# Patient Record
Sex: FEMALE | Race: WHITE | HISPANIC OR LATINO | Employment: UNEMPLOYED | URBAN - METROPOLITAN AREA
[De-identification: names, ages, dates, MRNs, and addresses within clinical notes are randomized per-mention and may not be internally consistent; named-entity substitution may affect disease eponyms.]

---

## 2023-03-10 ENCOUNTER — HOSPITAL ENCOUNTER (OUTPATIENT)
Dept: ULTRASOUND IMAGING | Facility: HOSPITAL | Age: 58
Discharge: HOME | End: 2023-03-10
Attending: INTERNAL MEDICINE

## 2023-03-10 DIAGNOSIS — E04.1 THYROID NODULE: ICD-10-CM

## 2023-08-22 ENCOUNTER — RA CDI HCC (OUTPATIENT)
Dept: OTHER | Facility: HOSPITAL | Age: 58
End: 2023-08-22

## 2023-08-22 NOTE — PROGRESS NOTES
[address] [facility] coding opportunities       Chart reviewed, no opportunity found: CHART REVIEWED, NO OPPORTUNITY FOUND        Patients Insurance     Medicare Insurance: Medicare

## 2023-08-28 ENCOUNTER — OFFICE VISIT (OUTPATIENT)
Dept: INTERNAL MEDICINE CLINIC | Facility: CLINIC | Age: 58
End: 2023-08-28
Payer: MEDICARE

## 2023-08-28 VITALS
TEMPERATURE: 97.6 F | RESPIRATION RATE: 18 BRPM | DIASTOLIC BLOOD PRESSURE: 90 MMHG | HEART RATE: 78 BPM | BODY MASS INDEX: 31.47 KG/M2 | OXYGEN SATURATION: 95 % | SYSTOLIC BLOOD PRESSURE: 140 MMHG | HEIGHT: 62 IN | WEIGHT: 171 LBS

## 2023-08-28 DIAGNOSIS — E78.2 MIXED HYPERLIPIDEMIA: ICD-10-CM

## 2023-08-28 DIAGNOSIS — Z12.31 ENCOUNTER FOR SCREENING MAMMOGRAM FOR BREAST CANCER: ICD-10-CM

## 2023-08-28 DIAGNOSIS — R73.9 HYPERGLYCEMIA: ICD-10-CM

## 2023-08-28 DIAGNOSIS — I10 ESSENTIAL HYPERTENSION: ICD-10-CM

## 2023-08-28 DIAGNOSIS — E04.1 THYROID NODULE: ICD-10-CM

## 2023-08-28 DIAGNOSIS — R21 SKIN RASH: ICD-10-CM

## 2023-08-28 DIAGNOSIS — Z00.00 MEDICARE ANNUAL WELLNESS VISIT, SUBSEQUENT: Primary | ICD-10-CM

## 2023-08-28 DIAGNOSIS — M25.512 ACUTE PAIN OF LEFT SHOULDER: ICD-10-CM

## 2023-08-28 DIAGNOSIS — E55.9 VITAMIN D DEFICIENCY: ICD-10-CM

## 2023-08-28 DIAGNOSIS — G40.909 NONINTRACTABLE EPILEPSY WITHOUT STATUS EPILEPTICUS, UNSPECIFIED EPILEPSY TYPE (HCC): ICD-10-CM

## 2023-08-28 PROCEDURE — 99213 OFFICE O/P EST LOW 20 MIN: CPT | Performed by: INTERNAL MEDICINE

## 2023-08-28 PROCEDURE — G0438 PPPS, INITIAL VISIT: HCPCS | Performed by: INTERNAL MEDICINE

## 2023-08-28 RX ORDER — NAPROXEN 500 MG/1
500 TABLET ORAL 2 TIMES DAILY WITH MEALS
Qty: 20 TABLET | Refills: 0 | Status: SHIPPED | OUTPATIENT
Start: 2023-08-28

## 2023-08-28 RX ORDER — CLOTRIMAZOLE AND BETAMETHASONE DIPROPIONATE 10; .64 MG/G; MG/G
CREAM TOPICAL 2 TIMES DAILY
Qty: 45 G | Refills: 1 | Status: SHIPPED | OUTPATIENT
Start: 2023-08-28

## 2023-08-28 RX ORDER — PHENOBARBITAL 64.8 MG/1
129.6 TABLET ORAL
COMMUNITY
Start: 2023-06-10

## 2023-08-28 RX ORDER — FOLIC ACID 1 MG/1
1000 TABLET ORAL DAILY
COMMUNITY
Start: 2023-07-26

## 2023-08-28 NOTE — ASSESSMENT & PLAN NOTE
Last fasting blood sugar 102. Advised to watch diet for sugar and carbs intake. We will follow blood sugar hemoglobin A1c.

## 2023-08-28 NOTE — PATIENT INSTRUCTIONS
Patient was advised to continue present medications. discussed with the patient medications and laboratory data in detail. Follow-up with me in 3 months or as advised. If any blood test was ordered please do 1 week prior to next appointment unless advise to get earlier.   If you have any questions please call the office [phone]

## 2023-08-28 NOTE — ASSESSMENT & PLAN NOTE
Last cholesterol 243, triglyceride 139, HDL 51, . She she stopped taking atorvastatin as it was causing excessive sleepiness per patient. Discussed with the patient consequences of uncontrolled hyperlipidemia. Patient would like to check her lipid panel and will decide about whether she wants to take medication or not. Advised to continue low-cholesterol low-carb diet.

## 2023-08-28 NOTE — ASSESSMENT & PLAN NOTE
She was advised to go for mammogram.  She was advised to see GYN specialist.  She does not want colonoscopy. She had a Cologuard test last year and it was negative.

## 2023-08-28 NOTE — PROGRESS NOTES
Assessment and Plan:     Problem List Items Addressed This Visit    None       Preventive health issues were discussed with patient, and age appropriate screening tests were ordered as noted in patient's After Visit Summary. Personalized health advice and appropriate referrals for health education or preventive services given if needed, as noted in patient's After Visit Summary.      History of Present Illness:     Patient presents for a Medicare Wellness Visit    HPI   Patient Care Team:  Kailey Oakley MD as PCP - General (Internal Medicine)     Review of Systems:     Review of Systems     Problem List:     Patient Active Problem List   Diagnosis   • Vitamin D deficiency   • Epilepsy ([address])   • BMI 31.0-31.9,adult   • Skin rash   • Mixed hyperlipidemia   • Essential hypertension   • BMI 33.0-33.9,adult   • BMI 32.0-32.9,adult   • Thyroid nodule   • Hyperglycemia   • Encounter for screening mammogram for breast cancer   • Annual physical exam      Past Medical and Surgical History:     Past Medical History:   Diagnosis Date   • BMI 31.0-31.9,adult 3/15/2021   • BMI 32.0-32.9,adult 5/25/2021   • BMI 33.0-33.9,adult 4/13/2021   • Epilepsy ([address]) 3/15/2021   • Essential hypertension 4/13/2021   • HTN (hypertension) 3/15/2021   • Hypercholesteremia 3/15/2021   • Hyperglycemia 7/20/2022   • Mixed hyperlipidemia 4/13/2021   • Skin rash 3/15/2021   • Thyroid nodule 7/20/2022   • Vitamin D deficiency 3/15/2021     Past Surgical History:   Procedure Laterality Date   • US GUIDED THYROID BIOPSY  12/19/2018      Family History:     Family History   Problem Relation Age of Onset   • Breast cancer Sister 43   • No Known Problems Sister    • No Known Problems Sister    • No Known Problems Sister    • No Known Problems Brother    • Breast cancer Cousin       Social History:     Social History     Socioeconomic History   • Marital status: /Civil Union     Spouse name: None   • Number of children: None   • Years of education: None   • Highest education level: None   Occupational History   • None   Tobacco Use   • Smoking status: Never   • Smokeless tobacco: Never   Vaping Use   • Vaping Use: Never used   Substance and Sexual Activity   • Alcohol use: Yes   • Drug use: Never   • Sexual activity: Not Currently   Other Topics Concern   • None   Social History Narrative   • None     Social Determinants of Health     Financial Resource Strain: Medium Risk (8/26/2023)    Overall Financial Resource Strain (CARDIA)    • Difficulty of Paying Living Expenses: Somewhat hard   Food Insecurity: Not on file   Transportation Needs: No Transportation Needs (8/26/2023)    PRAPARE - Transportation    • Lack of Transportation (Medical): No    • Lack of Transportation (Non-Medical):  No   Physical Activity: Not on file   Stress: Not on file   Social Connections: Not on file   Intimate Partner Violence: Not on file   Housing Stability: Not on file      Medications and Allergies:     Current Outpatient Medications   Medication Sig Dispense Refill   • Ascorbic Acid (vitamin C) 1000 MG tablet Take 1,000 mg by mouth daily     • cholecalciferol (VITAMIN D3) 1,000 units tablet Take 1,000 Units by mouth daily     • folic acid (FOLVITE) 1 mg tablet Take 1,000 mcg by mouth daily     • Multiple Vitamins-Minerals (multivitamin with minerals) tablet Take 1 tablet by mouth daily     • nebivolol (BYSTOLIC) 5 mg tablet Take 1 tablet (5 mg total) by mouth daily 90 tablet 1   • PHENobarbital 64.8 mg tablet Take 129.6 mg by mouth daily at bedtime     • atorvastatin (LIPITOR) 10 mg tablet Take 10 mg by mouth every 7 days (Patient not taking: Reported on 4/60/3061)     • Eslicarbazepine Acetate 800 MG TABS Take 1 tablet by mouth daily She takes this medicine every evening prescribed by her neurologist (Patient not taking: Reported on 8/28/2023)     • PHENobarbital 32.4 mg tablet Take 2 tablets by mouth daily She takes every evening (Patient not taking: Reported on 8/28/2023)       No current facility-administered medications for this visit. No Known Allergies   Immunizations: There is no immunization history on file for this patient. Health Maintenance:         Topic Date Due   • Hepatitis C Screening  Never done   • HIV Screening  Never done   • Cervical Cancer Screening  Never done   • Breast Cancer Screening: Mammogram  08/31/2022   • Colorectal Cancer Screening  09/24/2025         Topic Date Due   • COVID-19 Vaccine (1) Never done   • Influenza Vaccine (1) 09/01/2023      Medicare Screening Tests and Risk Assessments:         Health Risk Assessment:   Patient rates overall health as good. Patient feels that their physical health rating is same. Patient is satisfied with their life. Eyesight was rated as same. Hearing was rated as same. Patient feels that their emotional and mental health rating is same. Patients states they are sometimes angry. Patient states they are sometimes unusually tired/fatigued. Pain experienced in the last 7 days has been some. Patient's pain rating has been 5/10. Patient states that she has experienced no weight loss or gain in last 6 months. Depression Screening:   PHQ-2 Score: 2      Fall Risk Screening: In the past year, patient has experienced: no history of falling in past year      Urinary Incontinence Screening:   Patient has not leaked urine accidently in the last six months. Home Safety:  Patient does not have trouble with stairs inside or outside of their home. Patient has working smoke alarms and has working carbon monoxide detector. Home safety hazards include: loose rugs on the floor. Nutrition:   Current diet is Frequent junk food, Low Cholesterol, Low Carb and No Added Salt. Medications:   Patient is not currently taking any over-the-counter supplements. Patient is able to manage medications.      Activities of Daily Living (ADLs)/Instrumental Activities of Daily Living (IADLs):   Walk and transfer into and out of bed and chair?: Yes  Dress and groom yourself?: Yes    Bathe or shower yourself?: Yes    Feed yourself? Yes  Do your laundry/housekeeping?: No  Manage your money, pay your bills and track your expenses?: No  Make your own meals?: No    Do your own shopping?: Yes    Previous Hospitalizations:   Any hospitalizations or ED visits within the last 12 months?: No      Advance Care Planning:   Living will: No    Durable POA for healthcare: No    Advanced directive: No    Advanced directive counseling given: Yes    Five wishes given: Yes    Patient declined ACP directive: No    End of Life Decisions reviewed with patient: Yes      Cognitive Screening:   Provider or family/friend/caregiver concerned regarding cognition?: No    PREVENTIVE SCREENINGS      Cardiovascular Screening:    General: History Lipid Disorder and Screening Current      Diabetes Screening:     General: Screening Current      Colorectal Cancer Screening:     General: Screening Current      Breast Cancer Screening:     General: Screening Current    Due for: Mammogram        Cervical Cancer Screening:    General: Risks and Benefits Discussed      Osteoporosis Screening:    General: Screening Not Indicated      Abdominal Aortic Aneurysm (AAA) Screening:        General: Screening Not Indicated      Lung Cancer Screening:     General: Screening Not Indicated    Screening, Brief Intervention, and Referral to Treatment (SBIRT)    Screening  Typical number of drinks in a day: 0  Typical number of drinks in a week: 0  Interpretation: Low risk drinking behavior.     AUDIT-C Screenin) How often did you have a drink containing alcohol in the past year? never  2) How many drinks did you have on a typical day when you were drinking in the past year? 0  3) How often did you have 6 or more drinks on one occasion in the past year? never    AUDIT-C Score: 0  Interpretation: Score 0-2 (female): Negative screen for alcohol misuse    Single Item Drug Screening:  How often have you used an illegal drug (including marijuana) or a prescription medication for non-medical reasons in the past year? never    Single Item Drug Screen Score: 0  Interpretation: Negative screen for possible drug use disorder    Brief Intervention  Alcohol & drug use screenings were reviewed. No concerns regarding substance use disorder identified. Other Counseling Topics:   Car/seat belt/driving safety, skin self-exam, sunscreen and calcium and vitamin D intake and regular weightbearing exercise. No results found.      Physical Exam:     /90 (BP Location: Left arm, Patient Position: Sitting, Cuff Size: Standard)   Pulse 78   Temp 97.6 °F (36.4 °C) (Temporal)   Resp 18   Ht 5' 2" (1.575 m)   Wt 77.6 kg (171 lb)   SpO2 95%   BMI 31.28 kg/m²     Physical Exam     Dior Romo MD

## 2023-08-28 NOTE — ASSESSMENT & PLAN NOTE
Blood pressure elevated in the office today. Patient stated home blood pressures 130s over 60s. Patient does not want to increase her Bystolic dose at present. Advised to keep log of the blood pressure. Continue present medication and low-salt diet.

## 2023-08-28 NOTE — ASSESSMENT & PLAN NOTE
Complaint of left shoulder pain without any injury for last 3 weeks. She is very tender around [facility] joint area. Possible bursitis and or tendinitis or arthritis. Has a good range of motion. Has been applying pain patch and occasionally takes 1 Advil but is not effective. Will prescribe Naprosyn 5oo mg p.o. twice daily PC. Patient was demonstrated left shoulder exercise. If not resolved will refer to specialist.  Nolan Aschoff not to take any Advil with naproxen.

## 2023-08-28 NOTE — PROGRESS NOTES
Assessment/Plan:    1. Medicare annual wellness visit, subsequent  Assessment & Plan:  She was advised to go for mammogram.  She was advised to see GYN specialist.  She does not want colonoscopy. She had a Cologuard test last year and it was negative. 2. Essential hypertension  Assessment & Plan:  Blood pressure elevated in the office today. Patient stated home blood pressures 130s over 60s. Patient does not want to increase her Bystolic dose at present. Advised to keep log of the blood pressure. Continue present medication and low-salt diet. Orders:  -     CBC and differential; Future  -     Comprehensive metabolic panel; Future  -     UA (URINE) with reflex to Scope    3. Thyroid nodule  Assessment & Plan:  Had ultrasound March 2023. Has enlarged thyroid. Patient is concerned about enlarged thyroid will refer to an endocrinologist for further use and recommendation. Orders:  -     TSH, 3rd generation; Future  -     Ambulatory Referral to Endocrinology; Future    4. Nonintractable epilepsy without status epilepticus, unspecified epilepsy type [facility])  Assessment & Plan:  She has been seen and followed by neurologist for epilepsy. Orders:  -     CBC and differential; Future  -     Comprehensive metabolic panel; Future    5. Skin rash  Assessment & Plan:  She developed skin rash on her right upper arm dry scaly slightly erythematous starting from axilla to elbow without any vesicles. Denies any pain. Complain of itching. Most likely dermatitis and/or early eczema versus other etiology. Will start Lotrisone cream if not resolved will refer to dermatologist.    Orders:  -     CBC and differential; Future  -     clotrimazole-betamethasone (LOTRISONE) 1-0.05 % cream; Apply topically 2 (two) times a day    6. BMI 31.0-31.9,adult  Assessment & Plan:  Patient  was advised to decrease portion size. Advised to decrease carb, sugar, cholesterol intake. Advised to exercise 3-5 times per week.   Advised to lose weight. 7. Hyperglycemia  Assessment & Plan:  Last fasting blood sugar 102. Advised to watch diet for sugar and carbs intake. We will follow blood sugar hemoglobin A1c. Orders:  -     Comprehensive metabolic panel; Future  -     Hemoglobin A1C; Future  -     UA (URINE) with reflex to Scope    8. Mixed hyperlipidemia  Assessment & Plan:  Last cholesterol 243, triglyceride 139, HDL 51, . She she stopped taking atorvastatin as it was causing excessive sleepiness per patient. Discussed with the patient consequences of uncontrolled hyperlipidemia. Patient would like to check her lipid panel and will decide about whether she wants to take medication or not. Advised to continue low-cholesterol low-carb diet. Orders:  -     Lipid panel; Future  -     TSH, 3rd generation; Future    9. Vitamin D deficiency  Assessment & Plan:  Vitamin D deficient resolved after being on vitamin D supplement. Vitamin D last time was 41. She has been taking vitamin D supplement 1000 IU daily. We will follow vitamin D level and advise accordingly. Orders:  -     Comprehensive metabolic panel; Future  -     Vitamin D 25 hydroxy; Future    10. Encounter for screening mammogram for breast cancer  -     Mammo screening bilateral w 3d & cad; Future    11. Acute pain of left shoulder  Assessment & Plan:  Complaint of left shoulder pain without any injury for last 3 weeks. She is very tender around [facility] joint area. Possible bursitis and or tendinitis or arthritis. Has a good range of motion. Has been applying pain patch and occasionally takes 1 Advil but is not effective. Will prescribe Naprosyn 5oo mg p.o. twice daily PC. Patient was demonstrated left shoulder exercise. If not resolved will refer to specialist.  Socorro Sotelo not to take any Advil with naproxen. Orders:  -     naproxen (NAPROSYN) 500 mg tablet;  Take 1 tablet (500 mg total) by mouth 2 (two) times a day with meals      Depression Screening and Follow-up Plan: Patient was screened for depression during today's encounter. They screened negative with a PHQ-2 score of 0. Subjective: Patient presents for annual wellness examination as well as follow-up her medical problems. Patient ID: Tamika Velasco is a 66 y.o. female. HPI   72-year-old female patient presents for annual wellness exam as well as follow-up her medical problems. She denies any chest pain, shortness of breath, pain in abdomen. Denies any cough, fever, chills. Denies any nausea vomiting diarrhea. Complain of left shoulder pain for last 3 weeks without any injury. She has been applying pain patient occasionally she takes Advil but not effective. Complaint of skin rash on the right upper arm for last 1 week. Complaint of itching at the rash site. She has been seen and followed by her neurologist for her epilepsy. The following portions of the patient's history were reviewed and updated as appropriate:     Past Medical History:  She has a past medical history of Acute pain of left shoulder (8/28/2023), BMI 31.0-31.9,adult (3/15/2021), BMI 32.0-32.9,adult (5/25/2021), BMI 33.0-33.9,adult (4/13/2021), Epilepsy ([address]) (3/15/2021), Essential hypertension (4/13/2021), HTN (hypertension) (3/15/2021), Hypercholesteremia (3/15/2021), Hyperglycemia (7/20/2022), Medicare annual wellness visit, subsequent (8/28/2023), Mixed hyperlipidemia (4/13/2021), Skin rash (3/15/2021), Thyroid nodule (7/20/2022), and Vitamin D deficiency (3/15/2021). ,  _______________________________________________________________________  Past Surgical History:   has a past surgical history that includes US guided thyroid biopsy (12/19/2018). ,  _______________________________________________________________________  Family History:  family history includes Breast cancer in her cousin; Breast cancer (age of onset: 43) in her sister;  No Known Problems in her brother, sister, sister, and sister. ,  _______________________________________________________________________  Social History:   reports that she has never smoked. She has never used smokeless tobacco. She reports current alcohol use. She reports that she does not use drugs. ,  _______________________________________________________________________  Allergies:  is allergic to atorvastatin. .  _______________________________________________________________________  Current Outpatient Medications   Medication Sig Dispense Refill   • Ascorbic Acid (vitamin C) 1000 MG tablet Take 1,000 mg by mouth daily     • cholecalciferol (VITAMIN D3) 1,000 units tablet Take 1,000 Units by mouth daily     • clotrimazole-betamethasone (LOTRISONE) 1-0.05 % cream Apply topically 2 (two) times a day 45 g 1   • Eslicarbazepine Acetate 800 MG TABS Take 1 tablet by mouth daily She takes this medicine every evening prescribed by her neurologist     • folic acid (FOLVITE) 1 mg tablet Take 1,000 mcg by mouth daily     • Multiple Vitamins-Minerals (multivitamin with minerals) tablet Take 1 tablet by mouth daily     • naproxen (NAPROSYN) 500 mg tablet Take 1 tablet (500 mg total) by mouth 2 (two) times a day with meals 20 tablet 0   • nebivolol (BYSTOLIC) 5 mg tablet Take 1 tablet (5 mg total) by mouth daily 90 tablet 1   • PHENobarbital 64.8 mg tablet Take 129.6 mg by mouth daily at bedtime       No current facility-administered medications for this visit.     _______________________________________________________________________  Review of Systems   Constitutional: Negative for chills and fever. HENT: Negative for congestion, ear pain, hearing loss, nosebleeds, sinus pain, sore throat and trouble swallowing. Eyes: Negative for discharge, redness and visual disturbance. Respiratory: Negative for cough, chest tightness and shortness of breath. Cardiovascular: Negative for chest pain and palpitations.    Gastrointestinal: Negative for abdominal pain, blood in stool, constipation, diarrhea, nausea and vomiting. Genitourinary: Negative for dysuria, flank pain and hematuria. Musculoskeletal: Positive for arthralgias ( Left shoulder pain). Negative for myalgias and neck pain. Skin: Positive for rash (Right upper arm ). Negative for color change. Neurological: Negative for dizziness, speech difficulty, weakness and headaches. Hematological: Does not bruise/bleed easily. Psychiatric/Behavioral: Negative for agitation and behavioral problems. Objective:  Vitals:    08/28/23 1026   BP: 140/90   BP Location: Left arm   Patient Position: Sitting   Cuff Size: Standard   Pulse: 78   Resp: 18   Temp: 97.6 °F (36.4 °C)   TempSrc: Temporal   SpO2: 95%   Weight: 77.6 kg (171 lb)   Height: 5' 2" (1.575 m)     Body mass index is 31.28 kg/m². Physical Exam  Vitals and nursing note reviewed. Constitutional:       General: She is not in acute distress. Appearance: She is obese. HENT:      Head: Normocephalic and atraumatic. Right Ear: Ear canal and external ear normal.      Left Ear: Ear canal and external ear normal.      Nose: Nose normal.      Mouth/Throat:      Mouth: Mucous membranes are moist.      Pharynx: Oropharynx is clear. Eyes:      General: No scleral icterus. Right eye: No discharge. Left eye: No discharge. Extraocular Movements: Extraocular movements intact. Conjunctiva/sclera: Conjunctivae normal.   Cardiovascular:      Rate and Rhythm: Normal rate and regular rhythm. Pulses: Normal pulses. Heart sounds: No murmur heard. Pulmonary:      Effort: Pulmonary effort is normal. No respiratory distress. Breath sounds: Normal breath sounds. No wheezing, rhonchi or rales. Abdominal:      General: Bowel sounds are normal. There is no distension. Palpations: Abdomen is soft. Tenderness: There is no abdominal tenderness.    Musculoskeletal:         General: Tenderness ( Left shoulder tender AC joint area.  No gross swelling or redness noted has a good range of motion with some restriction when she tried to lift above head.) present. Normal range of motion. Cervical back: Normal range of motion and neck supple. No muscular tenderness. Right lower leg: No edema. Left lower leg: No edema. Skin:     General: Skin is warm. Findings: Rash ( Has a dry scaly slightly erythematous rash below right axilla to right elbow without any vesicles.) present. Neurological:      General: No focal deficit present. Mental Status: She is alert and oriented to person, place, and time. Motor: No weakness.       Coordination: Coordination normal.      Gait: Gait normal.   Psychiatric:         Mood and Affect: Mood normal.         Behavior: Behavior normal.

## 2023-08-28 NOTE — ASSESSMENT & PLAN NOTE
Had ultrasound March 2023. Has enlarged thyroid. Patient is concerned about enlarged thyroid will refer to an endocrinologist for further use and recommendation.

## 2023-08-28 NOTE — ASSESSMENT & PLAN NOTE
Vitamin D deficient resolved after being on vitamin D supplement. Vitamin D last time was 41. She has been taking vitamin D supplement 1000 IU daily. We will follow vitamin D level and advise accordingly.

## 2023-09-20 DIAGNOSIS — E78.2 MIXED HYPERLIPIDEMIA: Primary | ICD-10-CM

## 2023-09-20 DIAGNOSIS — Z79.899 HIGH RISK MEDICATION USE: ICD-10-CM

## 2023-09-20 DIAGNOSIS — R31.29 MICROSCOPIC HEMATURIA: ICD-10-CM

## 2023-09-20 LAB
25(OH)D3 SERPL-MCNC: 68 NG/ML (ref 30–100)
ALBUMIN SERPL-MCNC: 4.7 G/DL (ref 3.6–5.1)
ALBUMIN/GLOB SERPL: 1.6 (CALC) (ref 1–2.5)
ALP SERPL-CCNC: 161 U/L (ref 37–153)
ALT SERPL-CCNC: 7 U/L (ref 6–29)
APPEARANCE UR: CLEAR
AST SERPL-CCNC: 14 U/L (ref 10–35)
BACTERIA UR QL AUTO: ABNORMAL /HPF
BASOPHILS # BLD AUTO: 31 CELLS/UL (ref 0–200)
BASOPHILS NFR BLD AUTO: 0.5 %
BILIRUB SERPL-MCNC: 0.3 MG/DL (ref 0.2–1.2)
BILIRUB UR QL STRIP: NEGATIVE
BUN SERPL-MCNC: 11 MG/DL (ref 7–25)
BUN/CREAT SERPL: ABNORMAL (CALC) (ref 6–22)
CALCIUM SERPL-MCNC: 9.7 MG/DL (ref 8.6–10.4)
CHLORIDE SERPL-SCNC: 99 MMOL/L (ref 98–110)
CHOLEST SERPL-MCNC: 254 MG/DL
CHOLEST/HDLC SERPL: 4.2 (CALC)
CO2 SERPL-SCNC: 31 MMOL/L (ref 20–32)
COLOR UR: ABNORMAL
CREAT SERPL-MCNC: 0.52 MG/DL (ref 0.5–1.03)
EOSINOPHIL # BLD AUTO: 130 CELLS/UL (ref 15–500)
EOSINOPHIL NFR BLD AUTO: 2.1 %
ERYTHROCYTE [DISTWIDTH] IN BLOOD BY AUTOMATED COUNT: 12.2 % (ref 11–15)
GFR/BSA.PRED SERPLBLD CYS-BASED-ARV: 109 ML/MIN/1.73M2
GLOBULIN SER CALC-MCNC: 3 G/DL (CALC) (ref 1.9–3.7)
GLUCOSE SERPL-MCNC: 99 MG/DL (ref 65–99)
GLUCOSE UR QL STRIP: NEGATIVE
HBA1C MFR BLD: 5.3 % OF TOTAL HGB
HCT VFR BLD AUTO: 37.6 % (ref 35–45)
HDLC SERPL-MCNC: 60 MG/DL
HGB BLD-MCNC: 12.6 G/DL (ref 11.7–15.5)
HGB UR QL STRIP: NEGATIVE
HYALINE CASTS #/AREA URNS LPF: ABNORMAL /LPF
KETONES UR QL STRIP: NEGATIVE
LDLC SERPL CALC-MCNC: 162 MG/DL (CALC)
LEUKOCYTE ESTERASE UR QL STRIP: NEGATIVE
LYMPHOCYTES # BLD AUTO: 1922 CELLS/UL (ref 850–3900)
LYMPHOCYTES NFR BLD AUTO: 31 %
MCH RBC QN AUTO: 26.2 PG (ref 27–33)
MCHC RBC AUTO-ENTMCNC: 33.5 G/DL (ref 32–36)
MCV RBC AUTO: 78.2 FL (ref 80–100)
MONOCYTES # BLD AUTO: 360 CELLS/UL (ref 200–950)
MONOCYTES NFR BLD AUTO: 5.8 %
NEUTROPHILS # BLD AUTO: 3757 CELLS/UL (ref 1500–7800)
NEUTROPHILS NFR BLD AUTO: 60.6 %
NITRITE UR QL STRIP: NEGATIVE
NONHDLC SERPL-MCNC: 194 MG/DL (CALC)
PH UR STRIP: 7 [PH] (ref 5–8)
PLATELET # BLD AUTO: 372 THOUSAND/UL (ref 140–400)
PMV BLD REES-ECKER: 11.1 FL (ref 7.5–12.5)
POTASSIUM SERPL-SCNC: 4.4 MMOL/L (ref 3.5–5.3)
PROT SERPL-MCNC: 7.7 G/DL (ref 6.1–8.1)
PROT UR QL STRIP: ABNORMAL
RBC # BLD AUTO: 4.81 MILLION/UL (ref 3.8–5.1)
RBC #/AREA URNS HPF: ABNORMAL /HPF
SODIUM SERPL-SCNC: 139 MMOL/L (ref 135–146)
SP GR UR STRIP: 1.02 (ref 1–1.03)
SQUAMOUS #/AREA URNS HPF: ABNORMAL /HPF
TRIGL SERPL-MCNC: 169 MG/DL
TSH SERPL-ACNC: 0.78 MIU/L (ref 0.4–4.5)
WBC # BLD AUTO: 6.2 THOUSAND/UL (ref 3.8–10.8)
WBC #/AREA URNS HPF: ABNORMAL /HPF

## 2023-09-20 RX ORDER — ROSUVASTATIN CALCIUM 5 MG/1
5 TABLET, COATED ORAL DAILY
Qty: 30 TABLET | Refills: 2 | Status: SHIPPED | OUTPATIENT
Start: 2023-09-20

## 2023-09-21 ENCOUNTER — HOSPITAL ENCOUNTER (OUTPATIENT)
Dept: MAMMOGRAPHY | Facility: HOSPITAL | Age: 58
Discharge: HOME | End: 2023-09-21
Attending: INTERNAL MEDICINE
Payer: COMMERCIAL

## 2023-09-21 VITALS — BODY MASS INDEX: 30.91 KG/M2 | HEIGHT: 62 IN | WEIGHT: 168 LBS

## 2023-09-21 DIAGNOSIS — Z12.31 ENCOUNTER FOR SCREENING MAMMOGRAM FOR BREAST CANCER: ICD-10-CM

## 2023-09-21 PROCEDURE — 77063 BREAST TOMOSYNTHESIS BI: CPT

## 2023-09-21 PROCEDURE — 77067 SCR MAMMO BI INCL CAD: CPT

## 2023-09-22 ENCOUNTER — TELEPHONE (OUTPATIENT)
Dept: FAMILY MEDICINE CLINIC | Facility: CLINIC | Age: 58
End: 2023-09-22

## 2023-09-22 NOTE — TELEPHONE ENCOUNTER
----- Message from Aroldo Aly MD sent at 9/22/2023 10:48 AM EDT -----  Please call patient that her mammogram is okay. Will need follow-up mammogram after 1 year.

## 2023-10-12 DIAGNOSIS — E78.2 MIXED HYPERLIPIDEMIA: ICD-10-CM

## 2023-10-12 RX ORDER — ROSUVASTATIN CALCIUM 5 MG/1
5 TABLET, COATED ORAL DAILY
Qty: 90 TABLET | Refills: 0 | Status: SHIPPED | OUTPATIENT
Start: 2023-10-12

## 2023-10-27 PROBLEM — Z00.00 MEDICARE ANNUAL WELLNESS VISIT, SUBSEQUENT: Status: RESOLVED | Noted: 2023-08-28 | Resolved: 2023-10-27

## 2023-11-09 DIAGNOSIS — I10 ESSENTIAL HYPERTENSION: ICD-10-CM

## 2023-11-09 RX ORDER — NEBIVOLOL 5 MG/1
5 TABLET ORAL DAILY
Qty: 90 TABLET | Refills: 1 | Status: SHIPPED | OUTPATIENT
Start: 2023-11-09

## 2023-11-28 LAB
AMORPH URATE CRY URNS QL MICRO: ABNORMAL
BACTERIA UR QL AUTO: ABNORMAL /HPF
BILIRUB UR QL STRIP: NEGATIVE
CLARITY UR: ABNORMAL
COLOR UR: YELLOW
GLUCOSE UR STRIP-MCNC: NEGATIVE MG/DL
HGB UR QL STRIP.AUTO: NEGATIVE
KETONES UR STRIP-MCNC: NEGATIVE MG/DL
LEUKOCYTE ESTERASE UR QL STRIP: NEGATIVE
MUCOUS THREADS UR QL AUTO: ABNORMAL
NITRITE UR QL STRIP: NEGATIVE
NON-SQ EPI CELLS URNS QL MICRO: ABNORMAL /HPF
PH UR STRIP.AUTO: 7 [PH]
PROT UR STRIP-MCNC: ABNORMAL MG/DL
RBC #/AREA URNS AUTO: ABNORMAL /HPF
SP GR UR STRIP.AUTO: 1.02 (ref 1–1.03)
UROBILINOGEN UR STRIP-ACNC: <2 MG/DL
WBC #/AREA URNS AUTO: ABNORMAL /HPF